# Patient Record
Sex: FEMALE | Race: WHITE | URBAN - METROPOLITAN AREA
[De-identification: names, ages, dates, MRNs, and addresses within clinical notes are randomized per-mention and may not be internally consistent; named-entity substitution may affect disease eponyms.]

---

## 2024-10-21 ENCOUNTER — OFFICE VISIT (OUTPATIENT)
Dept: OBGYN CLINIC | Facility: CLINIC | Age: 24
End: 2024-10-21
Payer: COMMERCIAL

## 2024-10-21 VITALS
BODY MASS INDEX: 41.92 KG/M2 | HEART RATE: 111 BPM | DIASTOLIC BLOOD PRESSURE: 82 MMHG | WEIGHT: 251.63 LBS | HEIGHT: 65 IN | SYSTOLIC BLOOD PRESSURE: 114 MMHG

## 2024-10-21 DIAGNOSIS — Z11.3 ROUTINE SCREENING FOR STI (SEXUALLY TRANSMITTED INFECTION): ICD-10-CM

## 2024-10-21 DIAGNOSIS — Z01.419 WELL WOMAN EXAM WITH ROUTINE GYNECOLOGICAL EXAM: Primary | ICD-10-CM

## 2024-10-21 DIAGNOSIS — Z12.4 CERVICAL CANCER SCREENING: ICD-10-CM

## 2024-10-21 DIAGNOSIS — N92.6 IRREGULAR MENSES: ICD-10-CM

## 2024-10-21 DIAGNOSIS — G43.701 CHRONIC MIGRAINE WITHOUT AURA WITH STATUS MIGRAINOSUS, NOT INTRACTABLE: ICD-10-CM

## 2024-10-21 DIAGNOSIS — Z30.09 ENCOUNTER FOR OTHER GENERAL COUNSELING AND ADVICE ON CONTRACEPTION: ICD-10-CM

## 2024-10-21 PROCEDURE — 87491 CHLMYD TRACH DNA AMP PROBE: CPT

## 2024-10-21 PROCEDURE — 87591 N.GONORRHOEAE DNA AMP PROB: CPT

## 2024-10-21 PROCEDURE — 88175 CYTOPATH C/V AUTO FLUID REDO: CPT

## 2024-10-21 RX ORDER — DROSPIRENONE 4 MG/1
1 TABLET, FILM COATED ORAL DAILY
Qty: 84 TABLET | Refills: 3 | Status: SHIPPED | OUTPATIENT
Start: 2024-10-21 | End: 2024-11-18

## 2024-10-21 NOTE — PROGRESS NOTES
Krys Vidal is a 24 year old female  Patient's last menstrual period was 10/06/2024 (exact date).   Chief Complaint   Patient presents with    Wellness Visit     New Patient-Annual Exam     Contraception     Patient would like like to discuss the Depo Shot, currently not on any birth control    Other     Patient said NO to student in room    .  She is requesting STI screening.     She is interested in the Depo, concerned about weight gain. Having trouble remembering to take OCP daily, does not want the Implant, Mirena IUD considering.     She received the HPV vaccine previously.     OBSTETRICS HISTORY:  OB History    Para Term  AB Living   1       1     SAB IAB Ectopic Multiple Live Births   1              # Outcome Date GA Lbr Saurabh/2nd Weight Sex Type Anes PTL Lv   1 2021     SAB          GYNE HISTORY:  Periods  are irregular, moderate to heavy flow      History   Sexual Activity    Sexual activity: Yes    Partners: Male     Comment: None        Hx Prior Abnormal Pap: No  Pap Date:  (3-4 years ago)  Pap Result Notes: Negative        MEDICAL HISTORY:  Past Medical History:    Anxiety    Dysmenorrhea       SURGICAL HISTORY:  Past Surgical History:   Procedure Laterality Date    Appendectomy         SOCIAL HISTORY:  Social History     Socioeconomic History    Marital status: Single     Spouse name: Not on file    Number of children: Not on file    Years of education: Not on file    Highest education level: Not on file   Occupational History    Not on file   Tobacco Use    Smoking status: Some Days     Current packs/day: 0.50     Average packs/day: 0.5 packs/day for 8.0 years (4.0 ttl pk-yrs)     Types: Cigarettes    Smokeless tobacco: Never   Substance and Sexual Activity    Alcohol use: Not Currently    Drug use: Not Currently     Types: Cannabis    Sexual activity: Yes     Partners: Male     Comment: None   Other Topics Concern    Caffeine Concern No    Exercise No    Seat Belt No     Special Diet No    Stress Concern No    Weight Concern No   Social History Narrative    Not on file     Social Drivers of Health     Financial Resource Strain: Not on file   Food Insecurity: Not on file   Transportation Needs: Not on file   Physical Activity: Not on file   Stress: Not on file   Social Connections: Not on file   Housing Stability: Not on file       FAMILY HISTORY:  Family History   Problem Relation Age of Onset    Psychiatric Mother     Hypertension Father     Diabetes Maternal Grandfather     Cancer Maternal Grandmother        MEDICATIONS:    Current Outpatient Medications:     Drospirenone (SLYND) 4 MG Oral Tab, Take 1 tablet by mouth daily for 28 days., Disp: 84 tablet, Rfl: 3    ALLERGIES:  Allergies[1]      Review of Systems:  Constitutional:  Denies fatigue, night sweats, hot flashes  Eyes:  denies blurred or double vision  Cardiovascular:  denies chest pain or palpitations  Respiratory:  denies shortness of breath  Gastrointestinal:  denies heartburn, abdominal pain, diarrhea or constipation  Genitourinary:  denies dysuria, incontinence, abnormal vaginal discharge, vaginal itching  Musculoskeletal:  denies back pain.  Skin/Breast:  Denies any breast pain, lumps, or discharge.   Neurological:  denies headaches, extremity weakness or numbness.  Psychiatric: denies depression or anxiety.  Endocrine:   denies excessive thirst or urination.  Heme/Lymph:  denies history of anemia, easy bruising or bleeding.      PHYSICAL EXAM:   Constitutional: well developed, well nourished  Head/Face: normocephalic  Neck/Thyroid: thyroid symmetric, no thyromegaly, no nodules, no adenopathy  Lymphatic:no abnormal supraclavicular or axillary adenopathy is noted  Breast: normal without palpable masses, tenderness, asymmetry, nipple discharge, nipple retraction or skin changes  Abdomen:  soft, nontender, nondistended, no masses  Skin/Hair: no unusual rashes or bruises  Extremities: no edema, no cyanosis  Psychiatric:   Oriented to time, place, person and situation. Appropriate mood and affect    Pelvic Exam:  External Genitalia: normal appearance, hair distribution, and no lesions  Urethral Meatus:  normal in size, location, without lesions and prolapse  Bladder:  No fullness, masses or tenderness  Vagina:  Normal appearance without lesions, no abnormal discharge  Cervix:  Normal without tenderness on motion  Uterus: normal in size, contour, position, mobility, without tenderness  Adnexa: normal without masses or tenderness  Perineum: normal  Anus: no hemorroids     Assessment & Plan:  Diagnoses and all orders for this visit:    Well woman exam with routine gynecological exam    Cervical cancer screening  -     ThinPrep PAP with HPV Reflex Request B; Future    Routine screening for STI (sexually transmitted infection)  -     Chlamydia/Gc Amplification; Future  -     HIV AG AB COMBO; Future  -     T Pallidum Screening Cascade; Future  -     Hepatitis B Surface Antigen; Future  -     HCV Antibody; Future    Encounter for other general counseling and advice on contraception    Chronic migraine without aura with status migrainosus, not intractable  -     Drospirenone (SLYND) 4 MG Oral Tab; Take 1 tablet by mouth daily for 28 days.    Irregular menses      She wants to try POP first, will call office if she decides to switch to Depo or Mirena.                [1] Not on File

## 2024-10-23 ENCOUNTER — TELEPHONE (OUTPATIENT)
Facility: CLINIC | Age: 24
End: 2024-10-23

## 2024-10-23 LAB
C TRACH DNA SPEC QL NAA+PROBE: POSITIVE
N GONORRHOEA DNA SPEC QL NAA+PROBE: NEGATIVE

## 2024-10-23 RX ORDER — DOXYCYCLINE HYCLATE 100 MG
100 TABLET ORAL 2 TIMES DAILY
Qty: 14 TABLET | Refills: 0 | Status: SHIPPED | OUTPATIENT
Start: 2024-10-23 | End: 2024-10-30

## 2024-10-23 NOTE — TELEPHONE ENCOUNTER
Marina from [facility] lab called with abnormal results. Routed to Lynne.

## 2024-10-24 ENCOUNTER — TELEPHONE (OUTPATIENT)
Facility: CLINIC | Age: 24
End: 2024-10-24

## 2024-10-24 NOTE — TELEPHONE ENCOUNTER
Spoke with patient. She was given 2 samples of Slynd. May start Depo injections after the 2 months of Slynd. Discussed cost of Slynd. If she chooses to continue and too expensive, patient to call and have it sent to [facility].    Also Doxycycline not covered by insurance. She has a Good Rx coupon for a 7 day supply for $26. She will use the coupon and complete treatment.     Verbalized understanding.

## 2024-10-24 NOTE — TELEPHONE ENCOUNTER
Pt just called and has some questions about her prescriptions/bc. Please advise and call her back. Thanks

## 2024-10-25 LAB
.: NORMAL
.: NORMAL

## 2024-11-07 ENCOUNTER — TELEPHONE (OUTPATIENT)
Facility: CLINIC | Age: 24
End: 2024-11-07

## 2024-11-07 NOTE — TELEPHONE ENCOUNTER
Spoke with patient, concerned because she is used to having heavy bleeding with periods ad her period is very light this time.   Started OCP mid cycle.   Patient advised she can have irregular bleeding for the first 3-4 months after starting/stopping, or changing OCP. Lighter bleeding not concerning.   Should call the office or seek emergency care with heavy bleeding or severe pain. Understanding verbalized.

## 2024-11-15 ENCOUNTER — TELEPHONE (OUTPATIENT)
Facility: CLINIC | Age: 24
End: 2024-11-15

## 2024-11-15 NOTE — TELEPHONE ENCOUNTER
Spoke with patient. Patient complaining on spotting since 11/06/2024. Patient started new birth control about 3 weeks ago. Advised patient that abnormal bleeding/period is normal for the first 3 months of starting a new birth control. Advised patient to monitor it and let us know if it persists or gets heavier. Advised if she is saturating a pad front to back within one hour to call our office. Understanding verbalized.

## 2024-12-23 ENCOUNTER — OFFICE VISIT (OUTPATIENT)
Dept: OBGYN CLINIC | Facility: CLINIC | Age: 24
End: 2024-12-23
Payer: COMMERCIAL

## 2024-12-23 VITALS
HEIGHT: 65 IN | WEIGHT: 249.13 LBS | HEART RATE: 92 BPM | BODY MASS INDEX: 41.51 KG/M2 | DIASTOLIC BLOOD PRESSURE: 85 MMHG | SYSTOLIC BLOOD PRESSURE: 121 MMHG

## 2024-12-23 DIAGNOSIS — Z20.2 CHLAMYDIA CONTACT, TREATED: Primary | ICD-10-CM

## 2024-12-23 DIAGNOSIS — G43.701 CHRONIC MIGRAINE WITHOUT AURA WITH STATUS MIGRAINOSUS, NOT INTRACTABLE: ICD-10-CM

## 2024-12-23 PROCEDURE — 87591 N.GONORRHOEAE DNA AMP PROB: CPT

## 2024-12-23 PROCEDURE — 87491 CHLMYD TRACH DNA AMP PROBE: CPT

## 2024-12-23 RX ORDER — DROSPIRENONE 4 MG/1
1 TABLET, FILM COATED ORAL DAILY
Qty: 84 TABLET | Refills: 3 | Status: SHIPPED | OUTPATIENT
Start: 2024-12-23 | End: 2025-01-20

## 2024-12-23 NOTE — PROGRESS NOTES
Krys Vidal is a 24 year old female  Patient's last menstrual period was 2024 (exact date).   Chief Complaint   Patient presents with    Sexually Transmitted Infection Screen     [facility] for GC/Chlamydia, positive for chlamydia on 10/21/24    Contraception     Refill needed, pharmacy confirmed.   .  She did notify her partner, she was positive previously. She did complete the doxy as instructed.   She has not been sexually active since.     OBSTETRICS HISTORY:  OB History    Para Term  AB Living   1       1     SAB IAB Ectopic Multiple Live Births   1              # Outcome Date GA Lbr Saurabh/2nd Weight Sex Type Anes PTL Lv   1 2021     SAB          GYNE HISTORY:  Periods regular monthly    History   Sexual Activity    Sexual activity: Yes    Partners: Male     Comment: None        Hx Prior Abnormal Pap: No  Pap Date: 10/21/24  Pap Result Notes: negative        MEDICAL HISTORY:  Past Medical History:    Anxiety    Dysmenorrhea       SURGICAL HISTORY:  Past Surgical History:   Procedure Laterality Date    Appendectomy         SOCIAL HISTORY:  Social History     Socioeconomic History    Marital status: Single     Spouse name: Not on file    Number of children: Not on file    Years of education: Not on file    Highest education level: Not on file   Occupational History    Not on file   Tobacco Use    Smoking status: Some Days     Current packs/day: 0.50     Average packs/day: 0.5 packs/day for 8.0 years (4.0 ttl pk-yrs)     Types: Cigarettes    Smokeless tobacco: Never   Substance and Sexual Activity    Alcohol use: Not Currently    Drug use: Not Currently     Types: Cannabis    Sexual activity: Yes     Partners: Male     Comment: None   Other Topics Concern    Caffeine Concern No    Exercise No    Seat Belt No    Special Diet No    Stress Concern No    Weight Concern No   Social History Narrative    Not on file     Social Drivers of Health     Financial Resource Strain: Not on file    Food Insecurity: Not on file   Transportation Needs: Not on file   Physical Activity: Not on file   Stress: Not on file   Social Connections: Not on file   Housing Stability: Not on file       FAMILY HISTORY:  Family History   Problem Relation Age of Onset    Psychiatric Mother     Hypertension Father     Diabetes Maternal Grandfather     Cancer Maternal Grandmother        MEDICATIONS:    Current Outpatient Medications:     Drospirenone (SLYND) 4 MG Oral Tab, Take 1 tablet by mouth daily for 28 days., Disp: 84 tablet, Rfl: 3    ALLERGIES:  Allergies[1]      PHYSICAL EXAM:   Pelvic Exam:  External Genitalia: normal appearance, hair distribution, and no lesions  Urethral Meatus:  normal in size, location, without lesions and prolapse  Bladder:  No fullness, masses or tenderness  Vagina:  Normal appearance without lesions, no abnormal discharge  Cervix:  Normal without tenderness on motion  Uterus: normal in size, contour, position, mobility, without tenderness  Adnexa: normal without masses or tenderness  Perineum: normal  Anus: no hemorroids     Assessment & Plan:  1. Chronic migraine without aura with status migrainosus, not intractable    - Drospirenone (SLYND) 4 MG Oral Tab; Take 1 tablet by mouth daily for 28 days.  Dispense: 84 tablet; Refill: 3    2. Chlamydia contact, treated    - Chlamydia/Gc Amplification; Future                   [1] No Known Allergies

## 2024-12-24 LAB
C TRACH DNA SPEC QL NAA+PROBE: NEGATIVE
N GONORRHOEA DNA SPEC QL NAA+PROBE: NEGATIVE

## 2025-01-06 ENCOUNTER — TELEPHONE (OUTPATIENT)
Facility: CLINIC | Age: 25
End: 2025-01-06

## 2025-01-06 NOTE — TELEPHONE ENCOUNTER
Pt was seen on 12- and was told to call us if no period. Pt is calling to see what is the next step. Please advise and call pt. Thanks

## 2025-01-06 NOTE — TELEPHONE ENCOUNTER
Concern of no period for 2 months    Started Slynd in October was having light spotting then.    Advised to do HPT - per pt she is not sexual active.   Per pt she is stressed. Discussed any lifestyle changes: stress, diet, exercise, new medication can affect her cycle.  Patient verbalized understanding, agreed to and intend to comply with plan of care.